# Patient Record
Sex: FEMALE | Race: WHITE | NOT HISPANIC OR LATINO | Employment: UNEMPLOYED | URBAN - METROPOLITAN AREA
[De-identification: names, ages, dates, MRNs, and addresses within clinical notes are randomized per-mention and may not be internally consistent; named-entity substitution may affect disease eponyms.]

---
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
## 2022-07-12 ENCOUNTER — OFFICE VISIT (OUTPATIENT)
Dept: OBGYN CLINIC | Age: 22
End: 2022-07-12
Payer: COMMERCIAL

## 2022-07-12 VITALS — DIASTOLIC BLOOD PRESSURE: 58 MMHG | WEIGHT: 112.6 LBS | SYSTOLIC BLOOD PRESSURE: 100 MMHG

## 2022-07-12 DIAGNOSIS — N93.9 ABNORMAL UTERINE BLEEDING (AUB): ICD-10-CM

## 2022-07-12 DIAGNOSIS — N94.10 DYSPAREUNIA IN FEMALE: Primary | ICD-10-CM

## 2022-07-12 DIAGNOSIS — N93.0 POSTCOITAL BLEEDING: ICD-10-CM

## 2022-07-12 PROCEDURE — G0145 SCR C/V CYTO,THINLAYER,RESCR: HCPCS | Performed by: STUDENT IN AN ORGANIZED HEALTH CARE EDUCATION/TRAINING PROGRAM

## 2022-07-12 PROCEDURE — 87591 N.GONORRHOEAE DNA AMP PROB: CPT | Performed by: STUDENT IN AN ORGANIZED HEALTH CARE EDUCATION/TRAINING PROGRAM

## 2022-07-12 PROCEDURE — 87661 TRICHOMONAS VAGINALIS AMPLIF: CPT | Performed by: STUDENT IN AN ORGANIZED HEALTH CARE EDUCATION/TRAINING PROGRAM

## 2022-07-12 PROCEDURE — 99204 OFFICE O/P NEW MOD 45 MIN: CPT | Performed by: STUDENT IN AN ORGANIZED HEALTH CARE EDUCATION/TRAINING PROGRAM

## 2022-07-12 PROCEDURE — 87491 CHLMYD TRACH DNA AMP PROBE: CPT | Performed by: STUDENT IN AN ORGANIZED HEALTH CARE EDUCATION/TRAINING PROGRAM

## 2022-07-12 RX ORDER — DESOGESTREL AND ETHINYL ESTRADIOL 0.15-0.03
1 KIT ORAL DAILY
Qty: 84 TABLET | Refills: 1 | Status: SHIPPED | OUTPATIENT
Start: 2022-07-12 | End: 2022-10-24 | Stop reason: SDUPTHER

## 2022-07-12 NOTE — PROGRESS NOTES
Pt is here for 1st gyn visit   A few different concerns that she would like to discuss today   Pt is currently on OCP that she receives from [place]     Pt takes birth control pill continuously w/ breakthrough bleeding often    Pain/Bleeding after intercourse

## 2022-07-12 NOTE — PROGRESS NOTES
Assessment/Plan:       Problem List Items Addressed This Visit    None     Visit Diagnoses     Dyspareunia in female    -  Primary    Relevant Orders    US pelvis complete w transvaginal    Liquid-based pap, screening    Postcoital bleeding        Relevant Orders    Chlamydia/GC amplified DNA by PCR    Trichomonas Vaginalis, TONNY    Abnormal uterine bleeding (AUB)        Relevant Medications    desogestrel-ethinyl estradiol (APRI) 0 15-30 MG-MCG per tablet    Other Relevant Orders    US pelvis complete w transvaginal        - reviewed reproductive anatomy, as well as mechanism of action, safe and effective use, of OCPs  Intermenstrual/postaoital spotting most likely from low dosing EE and possibly LNG  Will switch OCP  Will assess with TVUS and STI testing, Pap    - In addition, we have discussed progestin-only LARD including depo provera, implant, LNG-IUD  The patient understands that she may experience irregular bleeding with each of these methods  With the long-acting reversible options, there is the risk of placement, migration and difficulty with removal   We have also discussed the paraguard IUD, which is a nonhormonal option often associated with heavier, more painful menses  The risks of this include risks of placement and migration  At this time, she will continue with OCP, will think further about LARC  - Dyspareunia: exam wnl, likely positional and not pathologic, but will assess for this (and bleeding) with TVUS  Ordered  RTO 3 mo pill check, reassess contraception    Subjective:      Patient ID: Delisa Rodriguez is a 25 y o  female  HPI  She presents today regarding several issues:   - firstly, she uses an OCP that is sent to her from a relative in [place]  This includes (per the packaging) EE 20 micrograms/ micrograms  She takes this for contraception as she is sexually active with one male partner currently   Appx 7 partners total  -she would like to discuss LARC, as she is not 100% reliable with her OCP  - she notes deep dyspareunia, positional  Denies dysmenorrhea  - postcoital bleeding  Sometimes this is just some spotting for 2-3 days, but can last for up to a week and be as heavy as a full period  The following portions of the patient's history were reviewed and updated as appropriate: allergies, current medications, past family history, past medical history, past social history, past surgical history and problem list     Review of Systems  as above    Objective:  /58 (BP Location: Right arm, Patient Position: Sitting, Cuff Size: Standard)   Wt 51 1 kg (112 lb 9 6 oz)      Physical Exam  Vitals reviewed  Constitutional:       Appearance: She is well-developed  HENT:      Head: Normocephalic  Cardiovascular:      Rate and Rhythm: Normal rate  Pulmonary:      Effort: Pulmonary effort is normal    Abdominal:      General: There is no distension  Palpations: Abdomen is soft  Tenderness: There is no abdominal tenderness  There is no guarding or rebound  Genitourinary:     General: Normal vulva  Exam position: Lithotomy position  Vagina: No vaginal discharge or bleeding  Cervix: Eversion present  No cervical motion tenderness  Comments: Inflamed TZ  Musculoskeletal:         General: Normal range of motion  Cervical back: Normal range of motion  Skin:     General: Skin is warm and dry  Neurological:      Mental Status: She is alert and oriented to person, place, and time     Psychiatric:         Behavior: Behavior normal          Overall MDM: moderate   Diagnosis moderate, Data moderate, Risk moderate

## 2022-07-14 LAB
C TRACH DNA SPEC QL NAA+PROBE: NEGATIVE
N GONORRHOEA DNA SPEC QL NAA+PROBE: NEGATIVE

## 2022-07-15 LAB — T VAGINALIS RRNA SPEC QL NAA+PROBE: NEGATIVE

## 2022-07-19 ENCOUNTER — HOSPITAL ENCOUNTER (OUTPATIENT)
Dept: ULTRASOUND IMAGING | Facility: CLINIC | Age: 22
Discharge: HOME | End: 2022-07-19
Payer: COMMERCIAL

## 2022-07-19 DIAGNOSIS — N94.10 DYSPAREUNIA IN FEMALE: ICD-10-CM

## 2022-07-19 DIAGNOSIS — N93.9 ABNORMAL UTERINE BLEEDING (AUB): ICD-10-CM

## 2022-07-19 LAB
LAB AP GYN PRIMARY INTERPRETATION: NORMAL
Lab: NORMAL

## 2022-07-19 PROCEDURE — 76856 US EXAM PELVIC COMPLETE: CPT

## 2022-07-19 PROCEDURE — 76830 TRANSVAGINAL US NON-OB: CPT

## 2022-10-21 NOTE — PROGRESS NOTES
Jake Helms  2000    Assessment and Plan:  Yearly exam without abnormality      -Pap up to date  We reviewed ASCCP guidelines for Pap testing today    -Happy with OCP  Refilled    RTO one year for yearly exam or sooner as needed  CC:  Yearly exam    S:  25 y o  female here for yearly exam      G0  LMP 9/30  Contraception: Apri   Last Pap: 7/2022 NILM    Vape and marijuana use, social drinker  Exercises irregularly    Doing well overall  Her cycles are regular, not heavy  No further postcoital spotting  Still intermittent and positional deep dyspareunia  Menses painful, but better than before  STD testing:  She does not want STD testing today  Gardasil:  She has not had the Gardasil series  Family hx of breast cancer: denies  Family hx of ovarian cancer: denies  Family hx of colon cancer: denies     Denies hot flushes, regular dyspareunia, abnormal uterine bleeding, urinary/fecal incontinence, changes in energy levels, mood         Current Outpatient Medications:   •  desogestrel-ethinyl estradiol (APRI) 0 15-30 MG-MCG per tablet, Take 1 tablet by mouth daily, Disp: 84 tablet, Rfl: 4  Social History     Socioeconomic History   • Marital status: Single     Spouse name: Not on file   • Number of children: Not on file   • Years of education: Not on file   • Highest education level: Not on file   Occupational History   • Not on file   Tobacco Use   • Smoking status: Former Smoker   • Smokeless tobacco: Never Used   Vaping Use   • Vaping Use: Every day   • Substances: Nicotine, THC, CBD, Flavoring   Substance and Sexual Activity   • Alcohol use: Yes     Comment: Social   • Drug use: Yes     Types: Marijuana   • Sexual activity: Yes     Partners: Male     Birth control/protection: OCP   Other Topics Concern   • Not on file   Social History Narrative   • Not on file     Social Determinants of Health     Financial Resource Strain: Not on file   Food Insecurity: Not on file   Transportation Needs: Not on file   Physical Activity: Not on file   Stress: Not on file   Social Connections: Not on file   Intimate Partner Violence: Not on file   Housing Stability: Not on file     Family History   Problem Relation Age of Onset   • Breast cancer Neg Hx    • Colon cancer Neg Hx    • Ovarian cancer Neg Hx       History reviewed  No pertinent past medical history  Review of Systems   Respiratory: Negative  Cardiovascular: Negative  Gastrointestinal: Negative for constipation and diarrhea  Genitourinary: Negative for difficulty urinating, pelvic pain, vaginal bleeding, vaginal discharge, itching or odor  O:  Blood pressure 98/56, height 5' 3" (1 6 m), weight 51 9 kg (114 lb 6 4 oz), last menstrual period 09/30/2022  Patient appears well and is not in distress  Neck is supple without masses  Breasts are symmetrical without mass, tenderness, nipple discharge, skin changes or adenopathy  Abdomen is soft and nontender without masses     Pelvic exam deferred today

## 2022-10-24 ENCOUNTER — ANNUAL EXAM (OUTPATIENT)
Dept: OBGYN CLINIC | Age: 22
End: 2022-10-24
Payer: COMMERCIAL

## 2022-10-24 VITALS
DIASTOLIC BLOOD PRESSURE: 56 MMHG | BODY MASS INDEX: 20.27 KG/M2 | SYSTOLIC BLOOD PRESSURE: 98 MMHG | HEIGHT: 63 IN | WEIGHT: 114.4 LBS

## 2022-10-24 DIAGNOSIS — N93.9 ABNORMAL UTERINE BLEEDING (AUB): ICD-10-CM

## 2022-10-24 DIAGNOSIS — Z01.419 ENCOUNTER FOR GYNECOLOGICAL EXAMINATION (GENERAL) (ROUTINE) WITHOUT ABNORMAL FINDINGS: Primary | ICD-10-CM

## 2022-10-24 PROCEDURE — S0612 ANNUAL GYNECOLOGICAL EXAMINA: HCPCS | Performed by: STUDENT IN AN ORGANIZED HEALTH CARE EDUCATION/TRAINING PROGRAM

## 2022-10-24 RX ORDER — DESOGESTREL AND ETHINYL ESTRADIOL 0.15-0.03
1 KIT ORAL DAILY
Qty: 84 TABLET | Refills: 4 | Status: SHIPPED | OUTPATIENT
Start: 2022-10-24

## 2023-10-23 NOTE — PROGRESS NOTES
Emily Wilkins  2000    Assessment and Plan:  Yearly exam without abnormality.     -Pap up to date. We reviewed ASCCP guidelines for Pap testing today. -STD testing today  -Reviewed LARC methods, side effects, efficacy and duration. Handouts given for Nexplanon and Mirena. RTO one year for yearly exam or sooner as needed. CC:  Yearly exam    S:  21 y.o. female here for yearly exam.     G0  LMP 10/3  Contraception: OCP   Last Pap: 7/2022 NILM     Marijuana and vape use, social drinker  Exercises irregularly    Doing ok overall. Her cycles are regular and only 3 days in duration, but very heavy during those days. No intermenstrual spotting, so she is overall happier with this pill than others. Sexual activity: She is sexually active without pain, bleeding. Some dryness and decreased libido. STD testing:  She does want STD testing today. Gardasil:  She has not had the Gardasil series. Family hx of breast cancer: denies  Family hx of ovarian cancer: denies  Family hx of colon cancer: denies     Denies hot flushes, dyspareunia, abnormal uterine bleeding, urinary/fecal incontinence, changes in energy levels, mood.        Current Outpatient Medications:     desogestrel-ethinyl estradiol (APRI) 0.15-30 MG-MCG per tablet, Take 1 tablet by mouth daily, Disp: 84 tablet, Rfl: 4  Social History     Socioeconomic History    Marital status: Single     Spouse name: Not on file    Number of children: Not on file    Years of education: Not on file    Highest education level: Not on file   Occupational History    Not on file   Tobacco Use    Smoking status: Former    Smokeless tobacco: Never   Vaping Use    Vaping Use: Every day    Substances: Nicotine, THC, CBD, Flavoring   Substance and Sexual Activity    Alcohol use: Yes     Comment: Social    Drug use: Yes     Types: Marijuana    Sexual activity: Yes     Partners: Male     Birth control/protection: OCP   Other Topics Concern    Not on file Social History Narrative    Not on file     Social Determinants of Health     Financial Resource Strain: Not on file   Food Insecurity: Not on file   Transportation Needs: Not on file   Physical Activity: Not on file   Stress: Not on file   Social Connections: Not on file   Intimate Partner Violence: Not on file   Housing Stability: Not on file     Family History   Problem Relation Age of Onset    Heart attack Father     Breast cancer Neg Hx     Colon cancer Neg Hx     Ovarian cancer Neg Hx       History reviewed. No pertinent past medical history. Review of Systems   Respiratory: Negative. Cardiovascular: Negative. Gastrointestinal: Negative for constipation and diarrhea. Genitourinary: Negative for difficulty urinating, pelvic pain, vaginal bleeding, vaginal discharge, itching or odor. O:  Blood pressure 100/62, height 5' 3" (1.6 m), weight 53.8 kg (118 lb 9.6 oz), last menstrual period 10/03/2023. Patient appears well and is not in distress  Neck is supple without masses  Breasts are symmetrical without mass, tenderness, nipple discharge, skin changes or adenopathy. Abdomen is soft and nontender without masses. External genitals are normal without lesions or rashes. Urethral meatus and urethra are normal  Bladder is normal to palpation  Vagina is normal without discharge or bleeding. Cervix is normal without discharge or lesion. Uterus is normal, mobile, nontender without palpable mass. Adnexa are normal, nontender, without palpable mass.

## 2023-10-24 ENCOUNTER — ANNUAL EXAM (OUTPATIENT)
Age: 23
End: 2023-10-24
Payer: COMMERCIAL

## 2023-10-24 VITALS
DIASTOLIC BLOOD PRESSURE: 62 MMHG | BODY MASS INDEX: 21.02 KG/M2 | SYSTOLIC BLOOD PRESSURE: 100 MMHG | HEIGHT: 63 IN | WEIGHT: 118.6 LBS

## 2023-10-24 DIAGNOSIS — Z11.3 SCREENING FOR STD (SEXUALLY TRANSMITTED DISEASE): ICD-10-CM

## 2023-10-24 DIAGNOSIS — N93.9 ABNORMAL UTERINE BLEEDING (AUB): ICD-10-CM

## 2023-10-24 DIAGNOSIS — Z01.419 ENCOUNTER FOR GYNECOLOGICAL EXAMINATION (GENERAL) (ROUTINE) WITHOUT ABNORMAL FINDINGS: Primary | ICD-10-CM

## 2023-10-24 PROCEDURE — 87661 TRICHOMONAS VAGINALIS AMPLIF: CPT | Performed by: STUDENT IN AN ORGANIZED HEALTH CARE EDUCATION/TRAINING PROGRAM

## 2023-10-24 PROCEDURE — 87491 CHLMYD TRACH DNA AMP PROBE: CPT | Performed by: STUDENT IN AN ORGANIZED HEALTH CARE EDUCATION/TRAINING PROGRAM

## 2023-10-24 PROCEDURE — S0612 ANNUAL GYNECOLOGICAL EXAMINA: HCPCS | Performed by: STUDENT IN AN ORGANIZED HEALTH CARE EDUCATION/TRAINING PROGRAM

## 2023-10-24 PROCEDURE — 87591 N.GONORRHOEAE DNA AMP PROB: CPT | Performed by: STUDENT IN AN ORGANIZED HEALTH CARE EDUCATION/TRAINING PROGRAM

## 2023-10-24 RX ORDER — DESOGESTREL AND ETHINYL ESTRADIOL 0.15-0.03
1 KIT ORAL DAILY
Qty: 84 TABLET | Refills: 4 | Status: SHIPPED | OUTPATIENT
Start: 2023-10-24

## 2023-10-26 LAB
C TRACH DNA SPEC QL NAA+PROBE: NEGATIVE
N GONORRHOEA DNA SPEC QL NAA+PROBE: NEGATIVE

## 2023-10-27 LAB — T VAGINALIS DNA SPEC QL NAA+PROBE: NEGATIVE

## 2024-09-17 DIAGNOSIS — N93.9 ABNORMAL UTERINE BLEEDING (AUB): ICD-10-CM

## 2024-09-17 NOTE — TELEPHONE ENCOUNTER
We rescheduled pts yearly from 10/29 until 12/19 and she needs her BC filled until that time please,  thanks so much

## 2024-09-18 RX ORDER — DESOGESTREL AND ETHINYL ESTRADIOL 0.15-0.03
1 KIT ORAL DAILY
Qty: 84 TABLET | Refills: 0 | Status: SHIPPED | OUTPATIENT
Start: 2024-09-18

## 2024-12-19 ENCOUNTER — ANNUAL EXAM (OUTPATIENT)
Age: 24
End: 2024-12-19
Payer: COMMERCIAL

## 2024-12-19 VITALS — DIASTOLIC BLOOD PRESSURE: 84 MMHG | BODY MASS INDEX: 21.08 KG/M2 | WEIGHT: 119 LBS | SYSTOLIC BLOOD PRESSURE: 110 MMHG

## 2024-12-19 DIAGNOSIS — Z01.419 ENCOUNTER FOR GYNECOLOGICAL EXAMINATION (GENERAL) (ROUTINE) WITHOUT ABNORMAL FINDINGS: Primary | ICD-10-CM

## 2024-12-19 PROCEDURE — S0612 ANNUAL GYNECOLOGICAL EXAMINA: HCPCS | Performed by: STUDENT IN AN ORGANIZED HEALTH CARE EDUCATION/TRAINING PROGRAM

## 2024-12-19 NOTE — PROGRESS NOTES
Zohra Abdi  2000    Assessment and Plan:  Yearly exam without abnormality.     -Pap up to date, due next year. We reviewed ASCCP guidelines for Pap testing today.   -Family planning reviewed    RTO 6 months, follow up or sooner as needed.        CC:  Yearly exam    S:  24 y.o. female here for yearly exam.     G0  LMP 12/15  Contraception: OCP   - boyfriend is in [place]. Would like to d/c OCP and use condoms when sexually active  Last Pap: 7/2022 NILM     Non-smoker, social drinker  Exercises irregularly    Doing ok overall.     Her cycles are regular, not heavy or crampy.     Sexual activity: She is sexually active without pain, bleeding or dryness.     STD testing:  She does not want STD testing today.     Gardasil:  She has not had the Gardasil series.     Family hx of breast cancer: denies  Family hx of ovarian cancer: denies  Family hx of colon cancer: denies     Denies hot flushes, dyspareunia, abnormal uterine bleeding, urinary/fecal incontinence, changes in energy levels, mood.     No current outpatient medications on file.  Social History     Socioeconomic History    Marital status: Single     Spouse name: Not on file    Number of children: Not on file    Years of education: Not on file    Highest education level: Not on file   Occupational History    Not on file   Tobacco Use    Smoking status: Former    Smokeless tobacco: Never   Vaping Use    Vaping status: Every Day    Substances: Nicotine, THC, CBD, Flavoring   Substance and Sexual Activity    Alcohol use: Yes     Comment: Social    Drug use: Yes     Types: Marijuana    Sexual activity: Yes     Partners: Male     Birth control/protection: OCP   Other Topics Concern    Not on file   Social History Narrative    Not on file     Social Drivers of Health     Financial Resource Strain: Not on file   Food Insecurity: Not on file   Transportation Needs: Not on file   Physical Activity: Not on file   Stress: Not on file   Social Connections: Not on  file   Intimate Partner Violence: Not on file   Housing Stability: Not on file     Family History   Problem Relation Age of Onset    Heart attack Father     Breast cancer Neg Hx     Colon cancer Neg Hx     Ovarian cancer Neg Hx       History reviewed. No pertinent past medical history.     Review of Systems   Respiratory: Negative.    Cardiovascular: Negative.    Gastrointestinal: Negative for constipation and diarrhea.   Genitourinary: Negative for difficulty urinating, pelvic pain, vaginal bleeding, vaginal discharge, itching or odor.    O:  Blood pressure 110/84, weight 54 kg (119 lb), last menstrual period 12/15/2024.    Patient appears well and is not in distress  Neck is supple without masses  Breasts are symmetrical without mass, tenderness, nipple discharge, skin changes or adenopathy.   Abdomen is soft and nontender without masses.   External genitals are normal without lesions or rashes.  Urethral meatus and urethra are normal  Bladder is normal to palpation  Vagina is normal without discharge or bleeding.   Cervix is normal without discharge or lesion.   Uterus is normal, mobile, nontender without palpable mass.  Adnexa are normal, nontender, without palpable mass.